# Patient Record
Sex: FEMALE | Race: WHITE | NOT HISPANIC OR LATINO | URBAN - METROPOLITAN AREA
[De-identification: names, ages, dates, MRNs, and addresses within clinical notes are randomized per-mention and may not be internally consistent; named-entity substitution may affect disease eponyms.]

---

## 2022-11-02 ENCOUNTER — WEB ENCOUNTER (OUTPATIENT)
Dept: URBAN - METROPOLITAN AREA CLINIC 57 | Facility: CLINIC | Age: 82
End: 2022-11-02

## 2022-11-02 ENCOUNTER — OFFICE VISIT (OUTPATIENT)
Dept: URBAN - METROPOLITAN AREA CLINIC 57 | Facility: CLINIC | Age: 82
End: 2022-11-02
Payer: MEDICARE

## 2022-11-02 VITALS
OXYGEN SATURATION: 97 % | TEMPERATURE: 98 F | DIASTOLIC BLOOD PRESSURE: 70 MMHG | HEIGHT: 68 IN | WEIGHT: 162.2 LBS | SYSTOLIC BLOOD PRESSURE: 110 MMHG | HEART RATE: 60 BPM | BODY MASS INDEX: 24.58 KG/M2

## 2022-11-02 DIAGNOSIS — K62.5 RECTAL BLEEDING: ICD-10-CM

## 2022-11-02 DIAGNOSIS — R19.4 CHANGE IN BOWEL HABIT: ICD-10-CM

## 2022-11-02 PROCEDURE — 99204 OFFICE O/P NEW MOD 45 MIN: CPT | Performed by: INTERNAL MEDICINE

## 2022-11-02 RX ORDER — BACLOFEN 10 MG/1
1 TABLET AS NEEDED TABLET ORAL TWICE A DAY
Status: ACTIVE | COMMUNITY

## 2022-11-02 RX ORDER — PRAVASTATIN SODIUM 40 MG/1
1 TABLET TABLET ORAL ONCE A DAY
Status: ACTIVE | COMMUNITY

## 2022-11-02 RX ORDER — CITALOPRAM 20 MG/1
1 TABLET TABLET, FILM COATED ORAL ONCE A DAY
Status: ACTIVE | COMMUNITY

## 2022-11-02 RX ORDER — AMLODIPINE BESYLATE 2.5 MG/1
1 TABLET TABLET ORAL ONCE A DAY
Status: ACTIVE | COMMUNITY

## 2022-11-02 RX ORDER — LETROZOLE 2.5 MG/1
1 TABLET TABLET, FILM COATED ORAL ONCE A DAY
Status: ACTIVE | COMMUNITY

## 2022-11-02 NOTE — HPI-TODAY'S VISIT:
Self-referred patient here for second opinion. Starting this year, she has been having problems with what she describes as constipation and hard stool. Symptoms have a bowel movement probably once a day but the stool is difficult to expel. She has been having some mucus associated with it. She was being followed by Dr. Gould, her last colonoscopy was in 2020 which showed some small tubular adenomas and moderate internalhemorrhoids. She had a visit with him in May of this year for similar complaints and he had recommended that she increase her water intake and take MiraLAX as needed. She has been taking MiraLAX intermittently with somewhat helps. Recently she went up to [facility] where she had an episode of bright red blood followed by some dark blood. She has been on Coumadin for a possible TIA, she felt that it was causing excessive bleeding and she has stopped it for the last 2 and half weeks. She has not noticed any bleeding symptoms. Her appetite is good weight has been fairly steady though she reports some weight loss. She denies any abdominal pain. She does drink some water. She does not take any NSAIDs.

## 2022-11-30 ENCOUNTER — OFFICE VISIT (OUTPATIENT)
Dept: URBAN - METROPOLITAN AREA CLINIC 63 | Facility: CLINIC | Age: 82
End: 2022-11-30

## 2022-12-05 ENCOUNTER — LAB OUTSIDE AN ENCOUNTER (OUTPATIENT)
Dept: URBAN - METROPOLITAN AREA CLINIC 63 | Facility: CLINIC | Age: 82
End: 2022-12-05

## 2022-12-06 LAB
A/G RATIO: 1.3
ALBUMIN: 3.8
ALKALINE PHOSPHATASE: 67
ALT (SGPT): 10
AMBIG ABBREV CMP14 DEFAULT: (no result)
AST (SGOT): 13
BASO (ABSOLUTE): 0.1
BASOS: 1
BILIRUBIN, TOTAL: 0.4
BUN/CREATININE RATIO: 26
BUN: 19
CALCIUM: 9
CARBON DIOXIDE, TOTAL: 24
CHLORIDE: 104
CREATININE: 0.73
EGFR: 83
EOS (ABSOLUTE): 0.2
EOS: 4
GLOBULIN, TOTAL: 2.9
GLUCOSE: 88
HEMATOCRIT: 36.1
HEMATOLOGY COMMENTS:: (no result)
HEMOGLOBIN: 12.3
IMMATURE CELLS: (no result)
IMMATURE GRANS (ABS): 0
IMMATURE GRANULOCYTES: 0
LYMPHS (ABSOLUTE): 1
LYMPHS: 18
MCH: 29.6
MCHC: 34.1
MCV: 87
MONOCYTES(ABSOLUTE): 0.5
MONOCYTES: 10
NEUTROPHILS (ABSOLUTE): 3.6
NEUTROPHILS: 67
NRBC: (no result)
PLATELETS: 251
POTASSIUM: 4.6
PROTEIN, TOTAL: 6.7
RBC: 4.16
RDW: 13.2
SODIUM: 139
WBC: 5.4

## 2022-12-07 ENCOUNTER — DASHBOARD ENCOUNTERS (OUTPATIENT)
Age: 82
End: 2022-12-07

## 2022-12-07 ENCOUNTER — OFFICE VISIT (OUTPATIENT)
Dept: URBAN - METROPOLITAN AREA CLINIC 57 | Facility: CLINIC | Age: 82
End: 2022-12-07
Payer: MEDICARE

## 2022-12-07 VITALS
DIASTOLIC BLOOD PRESSURE: 60 MMHG | WEIGHT: 166 LBS | HEIGHT: 68 IN | SYSTOLIC BLOOD PRESSURE: 110 MMHG | TEMPERATURE: 97.5 F | HEART RATE: 68 BPM | OXYGEN SATURATION: 98 % | BODY MASS INDEX: 25.16 KG/M2

## 2022-12-07 DIAGNOSIS — K59.00 CONSTIPATION, UNSPECIFIED CONSTIPATION TYPE: ICD-10-CM

## 2022-12-07 PROBLEM — 14760008: Status: ACTIVE | Noted: 2022-12-07

## 2022-12-07 PROCEDURE — 99213 OFFICE O/P EST LOW 20 MIN: CPT | Performed by: NURSE PRACTITIONER

## 2022-12-07 RX ORDER — AMLODIPINE BESYLATE 2.5 MG/1
1 TABLET TABLET ORAL ONCE A DAY
Status: ACTIVE | COMMUNITY

## 2022-12-07 RX ORDER — PRAVASTATIN SODIUM 40 MG/1
1 TABLET TABLET ORAL ONCE A DAY
Status: ACTIVE | COMMUNITY

## 2022-12-07 RX ORDER — LETROZOLE 2.5 MG/1
1 TABLET TABLET, FILM COATED ORAL ONCE A DAY
Status: ACTIVE | COMMUNITY

## 2022-12-07 RX ORDER — KRILL/OM-3/DHA/EPA/PHOSPHO/AST 1000-230MG
2 TABS CAPSULE ORAL ONCE A DAY
Status: ACTIVE | COMMUNITY

## 2022-12-07 RX ORDER — CITALOPRAM 20 MG/1
1 TABLET TABLET, FILM COATED ORAL ONCE A DAY
Status: ACTIVE | COMMUNITY

## 2022-12-07 RX ORDER — BACLOFEN 10 MG/1
1 TABLET AS NEEDED TABLET ORAL TWICE A DAY
Status: ACTIVE | COMMUNITY

## 2022-12-07 NOTE — HPI-PREVIOUS LABS
Laboratory results 12/05/2022 CBC is unremarkable Hemoglobin 12.3 within normal limits MCV and MCHC within normal limits CMP unremarkable

## 2023-06-20 NOTE — HPI-TODAY'S VISIT:
Ms. Pierre is a pleasant 82-year-old female evaluated in follow-up of rectal bleeding and change in bowel habit. She was previously evaluated 11/02/2022 as a new patient, self-referred for second opinion. She complained of constipation and hard stool beginning earlier in 2022. Having BM once daily, difficult pass, and with occasional mucus. Was followed by Dr. Gould, previous colonoscopy in 2000 with finding of small tubular adenoma was and moderate internal hemorrhoids. In May 2022 Dr. Gould advise she increase hydration and take MiraLAX as needed. She reports episode of BRBPR followed by dark blood. Taking Coumadin for possible TIA, however discontinued it about 2 weeks prior to previous exam.  No further episodes of bleeding. She was again advised to try fiber supplement such as Benefiber.  Repeat colonoscopy was deferred, after review of most recent colonoscopy findings. She was advised to increase water intake, consult her PCP and a/or cardiologist regarding Coumadin.  Routine labs were ordered, follow-up in about 1 month. Today, she is feeling somewhat better after starting Metamucil once daily. Having daily BM that does not require straining.  Drinking about 48 oz daily.  Has noticed less mucus on stool, no longer having blood tinged mucus. Taking apirin 81 mg two once daily, no longer taking Coumadin.  Denies additional GI symptoms.  No longer having any BRBPR. none

## 2024-11-08 ENCOUNTER — OFFICE VISIT (OUTPATIENT)
Dept: URBAN - METROPOLITAN AREA CLINIC 57 | Facility: CLINIC | Age: 84
End: 2024-11-08